# Patient Record
Sex: FEMALE | Race: WHITE | NOT HISPANIC OR LATINO
[De-identification: names, ages, dates, MRNs, and addresses within clinical notes are randomized per-mention and may not be internally consistent; named-entity substitution may affect disease eponyms.]

---

## 2019-03-06 ENCOUNTER — APPOINTMENT (OUTPATIENT)
Age: 79
End: 2019-03-06
Payer: MEDICARE

## 2019-03-06 ENCOUNTER — OUTPATIENT (OUTPATIENT)
Dept: OUTPATIENT SERVICES | Facility: HOSPITAL | Age: 79
LOS: 1 days | End: 2019-03-06
Payer: MEDICARE

## 2019-03-06 DIAGNOSIS — Z00.8 ENCOUNTER FOR OTHER GENERAL EXAMINATION: ICD-10-CM

## 2019-03-06 DIAGNOSIS — N83.209 UNSPECIFIED OVARIAN CYST, UNSPECIFIED SIDE: ICD-10-CM

## 2019-03-06 PROCEDURE — 71046 X-RAY EXAM CHEST 2 VIEWS: CPT | Mod: 26

## 2019-03-06 PROCEDURE — 71046 X-RAY EXAM CHEST 2 VIEWS: CPT

## 2019-12-20 ENCOUNTER — APPOINTMENT (OUTPATIENT)
Dept: RADIOLOGY | Facility: HOSPITAL | Age: 79
End: 2019-12-20
Payer: MEDICARE

## 2019-12-20 ENCOUNTER — OUTPATIENT (OUTPATIENT)
Dept: OUTPATIENT SERVICES | Facility: HOSPITAL | Age: 79
LOS: 1 days | End: 2019-12-20
Payer: MEDICARE

## 2019-12-20 DIAGNOSIS — Z00.8 ENCOUNTER FOR OTHER GENERAL EXAMINATION: ICD-10-CM

## 2019-12-20 PROCEDURE — 71046 X-RAY EXAM CHEST 2 VIEWS: CPT | Mod: 26

## 2019-12-20 PROCEDURE — 71046 X-RAY EXAM CHEST 2 VIEWS: CPT

## 2020-07-08 ENCOUNTER — OUTPATIENT (OUTPATIENT)
Dept: OUTPATIENT SERVICES | Facility: HOSPITAL | Age: 80
LOS: 1 days | End: 2020-07-08
Payer: MEDICARE

## 2020-07-08 ENCOUNTER — APPOINTMENT (OUTPATIENT)
Dept: MRI IMAGING | Facility: HOSPITAL | Age: 80
End: 2020-07-08
Payer: MEDICARE

## 2020-07-08 DIAGNOSIS — M23.91 UNSPECIFIED INTERNAL DERANGEMENT OF RIGHT KNEE: ICD-10-CM

## 2020-07-08 PROCEDURE — 73721 MRI JNT OF LWR EXTRE W/O DYE: CPT | Mod: 26,RT

## 2020-07-08 PROCEDURE — 73721 MRI JNT OF LWR EXTRE W/O DYE: CPT

## 2020-10-12 ENCOUNTER — APPOINTMENT (OUTPATIENT)
Dept: ULTRASOUND IMAGING | Facility: HOSPITAL | Age: 80
End: 2020-10-12
Payer: MEDICARE

## 2020-10-12 ENCOUNTER — APPOINTMENT (OUTPATIENT)
Dept: RADIOLOGY | Facility: HOSPITAL | Age: 80
End: 2020-10-12
Payer: MEDICARE

## 2020-10-12 ENCOUNTER — OUTPATIENT (OUTPATIENT)
Dept: OUTPATIENT SERVICES | Facility: HOSPITAL | Age: 80
LOS: 1 days | End: 2020-10-12
Payer: MEDICARE

## 2020-10-12 DIAGNOSIS — Z00.8 ENCOUNTER FOR OTHER GENERAL EXAMINATION: ICD-10-CM

## 2020-10-12 PROCEDURE — 77080 DXA BONE DENSITY AXIAL: CPT

## 2020-10-12 PROCEDURE — 76775 US EXAM ABDO BACK WALL LIM: CPT | Mod: 26

## 2020-10-12 PROCEDURE — 76775 US EXAM ABDO BACK WALL LIM: CPT

## 2020-10-12 PROCEDURE — 77080 DXA BONE DENSITY AXIAL: CPT | Mod: 26

## 2021-01-19 ENCOUNTER — TRANSCRIPTION ENCOUNTER (OUTPATIENT)
Age: 81
End: 2021-01-19

## 2021-01-19 NOTE — ASU PATIENT PROFILE, ADULT - ANESTHESIA, PREVIOUS REACTION, PROFILE
----- Message from Tanvir Haney MD sent at 8/7/2017  8:31 AM CDT -----  Needs appt asap to schedule surgery   nausea/vomiting

## 2021-01-19 NOTE — ASU PATIENT PROFILE, ADULT - PMH
Degenerative joint disease    Diabetes mellitus  Type 2  Hashimoto's disease    Hyperlipidemia    Hypertension    Osteoarthritis    Osteoporosis

## 2021-01-20 ENCOUNTER — OUTPATIENT (OUTPATIENT)
Dept: OUTPATIENT SERVICES | Facility: HOSPITAL | Age: 81
LOS: 1 days | End: 2021-01-20
Payer: MEDICARE

## 2021-01-20 VITALS
HEIGHT: 62 IN | HEART RATE: 67 BPM | RESPIRATION RATE: 16 BRPM | TEMPERATURE: 97 F | OXYGEN SATURATION: 97 % | SYSTOLIC BLOOD PRESSURE: 141 MMHG | DIASTOLIC BLOOD PRESSURE: 62 MMHG | WEIGHT: 156.97 LBS

## 2021-01-20 VITALS
RESPIRATION RATE: 16 BRPM | SYSTOLIC BLOOD PRESSURE: 149 MMHG | OXYGEN SATURATION: 97 % | DIASTOLIC BLOOD PRESSURE: 57 MMHG | HEART RATE: 57 BPM | TEMPERATURE: 97 F

## 2021-01-20 DIAGNOSIS — H25.9 UNSPECIFIED AGE-RELATED CATARACT: Chronic | ICD-10-CM

## 2021-01-20 DIAGNOSIS — Z90.710 ACQUIRED ABSENCE OF BOTH CERVIX AND UTERUS: Chronic | ICD-10-CM

## 2021-01-20 DIAGNOSIS — H25.11 AGE-RELATED NUCLEAR CATARACT, RIGHT EYE: ICD-10-CM

## 2021-01-20 PROCEDURE — 66984 XCAPSL CTRC RMVL W/O ECP: CPT | Mod: RT

## 2021-01-20 PROCEDURE — V2632: CPT

## 2021-01-20 RX ORDER — VALSARTAN 80 MG/1
1 TABLET ORAL
Qty: 0 | Refills: 0 | DISCHARGE

## 2021-01-20 RX ORDER — AZILSARTAN KAMEDOXOMIL 40 MG/1
0 TABLET ORAL
Qty: 0 | Refills: 0 | DISCHARGE

## 2021-01-20 RX ORDER — CHOLECALCIFEROL (VITAMIN D3) 125 MCG
0 CAPSULE ORAL
Qty: 0 | Refills: 0 | DISCHARGE

## 2021-01-20 RX ORDER — KETOROLAC TROMETHAMINE 0.5 %
1 DROPS OPHTHALMIC (EYE)
Refills: 0 | Status: COMPLETED | OUTPATIENT
Start: 2021-01-20 | End: 2021-01-20

## 2021-01-20 RX ORDER — SODIUM CHLORIDE 9 MG/ML
1000 INJECTION, SOLUTION INTRAVENOUS
Refills: 0 | Status: DISCONTINUED | OUTPATIENT
Start: 2021-01-20 | End: 2021-01-20

## 2021-01-20 RX ORDER — PHENYLEPHRINE HCL 2.5 %
1 DROPS OPHTHALMIC (EYE)
Refills: 0 | Status: COMPLETED | OUTPATIENT
Start: 2021-01-20 | End: 2021-01-20

## 2021-01-20 RX ORDER — ACETAMINOPHEN 500 MG
650 TABLET ORAL EVERY 6 HOURS
Refills: 0 | Status: DISCONTINUED | OUTPATIENT
Start: 2021-01-20 | End: 2021-02-03

## 2021-01-20 RX ORDER — AMLODIPINE BESYLATE 2.5 MG/1
1 TABLET ORAL
Qty: 0 | Refills: 0 | DISCHARGE

## 2021-01-20 RX ORDER — ASPIRIN/CALCIUM CARB/MAGNESIUM 324 MG
0 TABLET ORAL
Qty: 0 | Refills: 0 | DISCHARGE

## 2021-01-20 RX ORDER — UBIDECARENONE 100 MG
0 CAPSULE ORAL
Qty: 0 | Refills: 0 | DISCHARGE

## 2021-01-20 RX ORDER — TROPICAMIDE 1 %
1 DROPS OPHTHALMIC (EYE)
Refills: 0 | Status: COMPLETED | OUTPATIENT
Start: 2021-01-20 | End: 2021-01-20

## 2021-01-20 RX ORDER — CYCLOPENTOLATE HYDROCHLORIDE 10 MG/ML
1 SOLUTION/ DROPS OPHTHALMIC
Refills: 0 | Status: COMPLETED | OUTPATIENT
Start: 2021-01-20 | End: 2021-01-20

## 2021-01-20 RX ADMIN — Medication 1 DROP(S): at 06:30

## 2021-01-20 RX ADMIN — Medication 1 DROP(S): at 06:20

## 2021-01-20 RX ADMIN — Medication 1 DROP(S): at 06:25

## 2021-01-20 RX ADMIN — CYCLOPENTOLATE HYDROCHLORIDE 1 DROP(S): 10 SOLUTION/ DROPS OPHTHALMIC at 06:25

## 2021-01-20 RX ADMIN — CYCLOPENTOLATE HYDROCHLORIDE 1 DROP(S): 10 SOLUTION/ DROPS OPHTHALMIC at 06:30

## 2021-01-20 RX ADMIN — CYCLOPENTOLATE HYDROCHLORIDE 1 DROP(S): 10 SOLUTION/ DROPS OPHTHALMIC at 06:20

## 2021-01-20 NOTE — ASU DISCHARGE PLAN (ADULT/PEDIATRIC) - ASU DC SPECIAL INSTRUCTIONSFT
Keep shield on eye until office visit today at 2 pm  Any problems call office at [phone]    OFFICE VISIT TODAY AT 2 pm  Bring Drops

## 2021-01-20 NOTE — BRIEF OPERATIVE NOTE - NSICDXBRIEFPROCEDURE_GEN_ALL_CORE_FT
PROCEDURES:  Single stage extracapsular removal of cataract with insertion of intraocular lens prosthesis by phacoemulsification 20-Jan-2021 08:03:24  Jacob Vazquez

## 2021-10-08 ENCOUNTER — OUTPATIENT (OUTPATIENT)
Dept: OUTPATIENT SERVICES | Facility: HOSPITAL | Age: 81
LOS: 1 days | End: 2021-10-08
Payer: MEDICARE

## 2021-10-08 ENCOUNTER — APPOINTMENT (OUTPATIENT)
Dept: RADIOLOGY | Facility: HOSPITAL | Age: 81
End: 2021-10-08
Payer: MEDICARE

## 2021-10-08 DIAGNOSIS — M25.511 PAIN IN RIGHT SHOULDER: ICD-10-CM

## 2021-10-08 DIAGNOSIS — H25.9 UNSPECIFIED AGE-RELATED CATARACT: Chronic | ICD-10-CM

## 2021-10-08 DIAGNOSIS — Z90.710 ACQUIRED ABSENCE OF BOTH CERVIX AND UTERUS: Chronic | ICD-10-CM

## 2021-10-08 PROBLEM — E78.5 HYPERLIPIDEMIA, UNSPECIFIED: Chronic | Status: ACTIVE | Noted: 2021-01-19

## 2021-10-08 PROBLEM — M19.90 UNSPECIFIED OSTEOARTHRITIS, UNSPECIFIED SITE: Chronic | Status: ACTIVE | Noted: 2021-01-19

## 2021-10-08 PROBLEM — E06.3 AUTOIMMUNE THYROIDITIS: Chronic | Status: ACTIVE | Noted: 2021-01-19

## 2021-10-08 PROBLEM — M81.0 AGE-RELATED OSTEOPOROSIS WITHOUT CURRENT PATHOLOGICAL FRACTURE: Chronic | Status: ACTIVE | Noted: 2021-01-19

## 2021-10-08 PROBLEM — I10 ESSENTIAL (PRIMARY) HYPERTENSION: Chronic | Status: ACTIVE | Noted: 2021-01-19

## 2021-10-08 PROBLEM — E11.9 TYPE 2 DIABETES MELLITUS WITHOUT COMPLICATIONS: Chronic | Status: ACTIVE | Noted: 2021-01-19

## 2021-10-08 PROCEDURE — 73030 X-RAY EXAM OF SHOULDER: CPT | Mod: 26,RT

## 2021-10-08 PROCEDURE — 73030 X-RAY EXAM OF SHOULDER: CPT

## 2022-04-13 ENCOUNTER — TRANSCRIPTION ENCOUNTER (OUTPATIENT)
Age: 82
End: 2022-04-13

## 2022-09-20 ENCOUNTER — APPOINTMENT (OUTPATIENT)
Dept: ENDOCRINOLOGY | Facility: CLINIC | Age: 82
End: 2022-09-20

## 2022-09-27 ENCOUNTER — APPOINTMENT (OUTPATIENT)
Dept: ENDOCRINOLOGY | Facility: CLINIC | Age: 82
End: 2022-09-27

## 2022-09-27 VITALS
BODY MASS INDEX: 31.22 KG/M2 | SYSTOLIC BLOOD PRESSURE: 110 MMHG | TEMPERATURE: 98 F | WEIGHT: 174 LBS | OXYGEN SATURATION: 98 % | HEIGHT: 62.5 IN | HEART RATE: 78 BPM | DIASTOLIC BLOOD PRESSURE: 66 MMHG

## 2022-09-27 DIAGNOSIS — Z82.49 FAMILY HISTORY OF ISCHEMIC HEART DISEASE AND OTHER DISEASES OF THE CIRCULATORY SYSTEM: ICD-10-CM

## 2022-09-27 DIAGNOSIS — L40.50 ARTHROPATHIC PSORIASIS, UNSPECIFIED: ICD-10-CM

## 2022-09-27 DIAGNOSIS — F41.9 ANXIETY DISORDER, UNSPECIFIED: ICD-10-CM

## 2022-09-27 DIAGNOSIS — Z80.0 FAMILY HISTORY OF MALIGNANT NEOPLASM OF DIGESTIVE ORGANS: ICD-10-CM

## 2022-09-27 DIAGNOSIS — M54.32 SCIATICA, LEFT SIDE: ICD-10-CM

## 2022-09-27 DIAGNOSIS — I10 ESSENTIAL (PRIMARY) HYPERTENSION: ICD-10-CM

## 2022-09-27 DIAGNOSIS — N20.0 CALCULUS OF KIDNEY: ICD-10-CM

## 2022-09-27 PROCEDURE — 99203 OFFICE O/P NEW LOW 30 MIN: CPT

## 2022-09-27 NOTE — REVIEW OF SYSTEMS
[FreeTextEntry2] : Constitutional: No Fever\par Eyes: No visual difficulty\par ENT: Has difficulty hearing\par Cardiovascular: No palpitations\par Respiratory: No Cough\par Gastrointestinal: No nausea\par Genitourinary: No dysuria\par Musculoskeletal: No joint pain\par Neurological: No headaches\par Psychiatry: No sleep abnormalities\par Skin: No rashes\par Hematology: No bleeding

## 2022-09-27 NOTE — ASSESSMENT
[FreeTextEntry1] : The patient was found to have thyroid nodules on the MRI of the C-spine so I ordered a thyroid US.\par \par I WILL TRY TO OBTAIN THE OLD THYROID US REPORTS FROM HER PREVIOUS ENDOCRINOLOGIST - Kelly Humphrey MD - Endocrinology / Metab / Diabetes, Internal Medicine PHONE - [phone], FAX [phone]\par [address] [facility], [address], [place] [address]\par \par The patient has an autoimmune thyroid disorder - I ordered a TSH level and the rest of the thyroid antibody tests.\par \par Plan:\par 1. Labs done today - TSH, TPO abs, TSI, TSH-R abs\par 2. Thyroid US\par 3. Copy of previous thyroid US requested\par 4. Follow up one week after thyroid US done to review results.

## 2022-09-27 NOTE — PHYSICAL EXAM
[de-identified] : General: No distress, well nourished\par Eyes: Normal Sclera, EOMI, PERRL\par ENT: Normal appearance of the nose, normal oropharynx\par Neck/Thyroid: No cervical lymphadenopathy, thyroid gland 20 g in size, no thyroid nodules, non-tender\par Respiratory: No use of accessory muscles of respiration, vesicular breath sounds heard bilaterally, no crepitations or ronchi\par Cardiovascular: S1 and S2 heard and normal, no S3 or S4, no murmurs, radial pulse normal bilaterally\par Abdomen: soft, non-tender, no masses, normal bowel sounds\par Musculoskeletal: No swelling or deformities of joints of hands, no pedal edema\par Neurological: Normal range of motion in the hands, Normal brachioradialis reflexes bilaterally\par Psychiatry: Patient converses normally, good judgement and insight\par Skin: No rashes in hands, no nodules palpated in hands

## 2022-09-27 NOTE — HISTORY OF PRESENT ILLNESS
[FreeTextEntry1] : Problems:\par 1. Thyroid nodules\par 2. Autoimmune thyroid disorder\par \par Thyroid nodules/Autoimmune thyroid disorder\par 1. Diagnosed in 2020 with thyroid nodules when found on MRI c spine done for neck pain.\par In October 2021, thyroglobulin antibodies were positive\par 2. Labs:\par 10/15/2021 - TSH 1.83 N, Thyroglobulin antibodies 385 (</+40), thyroglobulin level 11.3 N, TSH 1.83 N\par 3. Radiology:\par 07/25/2022 - MRI C-spine - bilateral thyroid nodules identified measuring 2.2 cm on the left and 1.4 cm on the right. \par Patient never had an FNA.\par 4. No family history of thyroid disorders, no family history of thyroid cancer, no personal history of radiation treatment\par 5. Compression symptoms - no dysphagia, no SOB on lying flat

## 2022-09-28 ENCOUNTER — NON-APPOINTMENT (OUTPATIENT)
Age: 82
End: 2022-09-28

## 2022-09-28 LAB — TSH SERPL-ACNC: 1.09 UIU/ML

## 2022-09-29 LAB — THYROPEROXIDASE AB SERPL IA-ACNC: <10 IU/ML

## 2022-09-30 LAB
TSH RECEPTOR AB: <1.1 IU/L
TSI ACT/NOR SER: <0.1 IU/L

## 2022-11-09 ENCOUNTER — RESULT REVIEW (OUTPATIENT)
Age: 82
End: 2022-11-09

## 2022-11-09 ENCOUNTER — OUTPATIENT (OUTPATIENT)
Dept: OUTPATIENT SERVICES | Facility: HOSPITAL | Age: 82
LOS: 1 days | End: 2022-11-09
Payer: MEDICARE

## 2022-11-09 ENCOUNTER — APPOINTMENT (OUTPATIENT)
Dept: ULTRASOUND IMAGING | Facility: HOSPITAL | Age: 82
End: 2022-11-09

## 2022-11-09 DIAGNOSIS — H25.9 UNSPECIFIED AGE-RELATED CATARACT: Chronic | ICD-10-CM

## 2022-11-09 DIAGNOSIS — E04.2 NONTOXIC MULTINODULAR GOITER: ICD-10-CM

## 2022-11-09 DIAGNOSIS — Z90.710 ACQUIRED ABSENCE OF BOTH CERVIX AND UTERUS: Chronic | ICD-10-CM

## 2022-11-09 PROCEDURE — 76536 US EXAM OF HEAD AND NECK: CPT | Mod: 26

## 2022-11-09 PROCEDURE — 76536 US EXAM OF HEAD AND NECK: CPT

## 2022-11-16 ENCOUNTER — RESULT REVIEW (OUTPATIENT)
Age: 82
End: 2022-11-16

## 2022-11-16 ENCOUNTER — APPOINTMENT (OUTPATIENT)
Dept: ENDOCRINOLOGY | Facility: CLINIC | Age: 82
End: 2022-11-16

## 2022-11-16 VITALS
DIASTOLIC BLOOD PRESSURE: 60 MMHG | TEMPERATURE: 97.7 F | HEIGHT: 62 IN | OXYGEN SATURATION: 97 % | WEIGHT: 170 LBS | BODY MASS INDEX: 31.28 KG/M2 | RESPIRATION RATE: 16 BRPM | HEART RATE: 87 BPM | SYSTOLIC BLOOD PRESSURE: 122 MMHG

## 2022-11-16 PROCEDURE — 99213 OFFICE O/P EST LOW 20 MIN: CPT

## 2022-11-16 NOTE — PHYSICAL EXAM
[de-identified] : General: No distress, well nourished\par Eyes: Normal Sclera, EOMI, PERRL\par ENT: Normal appearance of the nose, normal oropharynx\par Neck/Thyroid: No cervical lymphadenopathy, thyroid gland 20 g in size, no thyroid nodules, non-tender\par Respiratory: No use of accessory muscles of respiration, vesicular breath sounds heard bilaterally, no crepitations or ronchi\par Cardiovascular: S1 and S2 heard and normal, no S3 or S4, no murmurs, radial pulse normal bilaterally\par Abdomen: soft, non-tender, no masses, normal bowel sounds\par Musculoskeletal: No swelling or deformities of joints of hands, no pedal edema\par Neurological: Normal range of motion in the hands, Normal brachioradialis reflexes bilaterally\par Psychiatry: Patient converses normally, good judgement and insight\par Skin: No rashes in hands, no nodules palpated in hands

## 2022-11-16 NOTE — ASSESSMENT
[FreeTextEntry1] : This patient has a euthyroid multinodular goiter - the patient has a history of radiation therapy so I ordered FNA of the left 2.2 x 1 x 1.6 cm midpole nodule and left lower pole 2 x 1.1 x 1.5 cm nodule.  \par \par The patient has an autoimmune thyroid disorder - thyroglobulin antibodies were positive but TSI, TSH-R abs and TPO antibodies were negative - the patient is euthyroid - I will monitor her TSH level every 3 to 6 months.\par \par Last TSH normal in September 2022. \par \par Plan:\par 1.  US guided FNA of the left 2.2 x 1 x 1.6 cm midpole nodule and left lower pole 2 x 1.1 x 1.5 cm nodule.  \par 2.  Follow up in 2 weeks after FNA done to review results.

## 2022-11-16 NOTE — HISTORY OF PRESENT ILLNESS
[FreeTextEntry1] : Problems:\par 1. Thyroid nodules\par 2. Autoimmune thyroid disorder - thyroglobulin antibodies positive in September 2022\par \par Thyroid nodules/Autoimmune thyroid disorder\par 1. Diagnosed in 2020 with thyroid nodules when found on MRI c spine done for neck pain.\par In October 2021, thyroglobulin antibodies were positive\par 2. Labs:\par 10/15/2021 - TSH 1.83 N, Thyroglobulin antibodies 385 (</+40), thyroglobulin level 11.3 N, TSH 1.83 N\par September 2022 - TSH normal, TPO antibodies normal, TSH-R abs normal, TSI normal\par 3. Radiology:\par A. 07/30/2020 - US thyroid - Right lobe measures 43 mm and contains multiple subcentimeter thyroid nodules.\par Left lobe measures 57 mm and contains a left solid nodule in the upper pole measuring 9 x 6 x 10 mm and a 31 x 12 x 19 mm lower pole thyroid nodule.  No abnormal lymph nodes seen. \par B. 07/25/2022 - MRI C-spine - bilateral thyroid nodules identified measuring 2.2 cm on the left and 1.4 cm on the right. \par Patient never had an FNA.\par C. 11/09/2022 - US thyroid - right lobe measures 4.6 cm and contains an upper pole 1.1 x 0.7 x 1.1 cm nodule - multiple subcentimeter nodules also seen in the right lobe. The left lobe measures 5.6 cm and contains an  upper pole 1.1 x 0.6 x 1 cm nodule and a mid pole 1.1 x 0.8 x 0.8 cm nodule and a 2.2 x 1 x 1.6 cm midpole nodule and a lower pole 2 x 1.1 x 1.5 cm nodule.  \par 4. 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 \par 5. Compression symptoms - no dysphagia, no SOB on lying flat

## 2022-12-29 ENCOUNTER — NON-APPOINTMENT (OUTPATIENT)
Age: 82
End: 2022-12-29

## 2022-12-30 ENCOUNTER — APPOINTMENT (OUTPATIENT)
Dept: RADIOLOGY | Facility: HOSPITAL | Age: 82
End: 2022-12-30
Payer: MEDICARE

## 2022-12-30 ENCOUNTER — OUTPATIENT (OUTPATIENT)
Dept: OUTPATIENT SERVICES | Facility: HOSPITAL | Age: 82
LOS: 1 days | End: 2022-12-30
Payer: MEDICARE
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
## 2022-12-30 DIAGNOSIS — H25.9 UNSPECIFIED AGE-RELATED CATARACT: Chronic | ICD-10-CM

## 2022-12-30 DIAGNOSIS — Z90.710 ACQUIRED ABSENCE OF BOTH CERVIX AND UTERUS: Chronic | ICD-10-CM

## 2022-12-30 PROCEDURE — 73130 X-RAY EXAM OF HAND: CPT | Mod: 26,RT

## 2022-12-30 PROCEDURE — 72190 X-RAY EXAM OF PELVIS: CPT

## 2022-12-30 PROCEDURE — 73502 X-RAY EXAM HIP UNI 2-3 VIEWS: CPT | Mod: 26,LT

## 2022-12-30 PROCEDURE — 73502 X-RAY EXAM HIP UNI 2-3 VIEWS: CPT

## 2022-12-30 PROCEDURE — 73130 X-RAY EXAM OF HAND: CPT

## 2023-01-11 ENCOUNTER — OUTPATIENT (OUTPATIENT)
Dept: OUTPATIENT SERVICES | Facility: HOSPITAL | Age: 83
LOS: 1 days | End: 2023-01-11
Payer: MEDICARE

## 2023-01-11 ENCOUNTER — APPOINTMENT (OUTPATIENT)
Dept: ULTRASOUND IMAGING | Facility: IMAGING CENTER | Age: 83
End: 2023-01-11
Payer: MEDICARE

## 2023-01-11 ENCOUNTER — RESULT REVIEW (OUTPATIENT)
Age: 83
End: 2023-01-11

## 2023-01-11 DIAGNOSIS — Z90.710 ACQUIRED ABSENCE OF BOTH CERVIX AND UTERUS: Chronic | ICD-10-CM

## 2023-01-11 DIAGNOSIS — R10.2 PELVIC AND PERINEAL PAIN: ICD-10-CM

## 2023-01-11 DIAGNOSIS — Z00.8 ENCOUNTER FOR OTHER GENERAL EXAMINATION: ICD-10-CM

## 2023-01-11 DIAGNOSIS — H25.9 UNSPECIFIED AGE-RELATED CATARACT: Chronic | ICD-10-CM

## 2023-01-11 PROCEDURE — 88173 CYTOPATH EVAL FNA REPORT: CPT

## 2023-01-11 PROCEDURE — 88172 CYTP DX EVAL FNA 1ST EA SITE: CPT

## 2023-01-11 PROCEDURE — 10005 FNA BX W/US GDN 1ST LES: CPT

## 2023-01-11 PROCEDURE — 88173 CYTOPATH EVAL FNA REPORT: CPT | Mod: 26

## 2023-01-19 ENCOUNTER — APPOINTMENT (OUTPATIENT)
Dept: ENDOCRINOLOGY | Facility: CLINIC | Age: 83
End: 2023-01-19
Payer: MEDICARE

## 2023-01-19 VITALS
RESPIRATION RATE: 16 BRPM | HEIGHT: 62 IN | BODY MASS INDEX: 31.47 KG/M2 | OXYGEN SATURATION: 98 % | TEMPERATURE: 97.1 F | SYSTOLIC BLOOD PRESSURE: 146 MMHG | HEART RATE: 81 BPM | DIASTOLIC BLOOD PRESSURE: 70 MMHG | WEIGHT: 171 LBS

## 2023-01-19 PROCEDURE — 99214 OFFICE O/P EST MOD 30 MIN: CPT

## 2023-01-19 NOTE — ASSESSMENT
[FreeTextEntry1] : This patient has a euthyroid multinodular goiter - FNA of the left 2.2 x 1 x 1.6 cm midpole nodule in Jan 2023 was benign - Next thyroid US due in November 2023.  \par \par The patient has an autoimmune thyroid disorder - thyroglobulin antibodies were positive but TSI, TSH-R abs and TPO antibodies were negative - the patient is euthyroid - I will monitor her TSH level every 3 to 6 months.\par \par Last TSH normal in September 2022. \par Last Thyroid US - November 2022 \par \par Plan:\par 1.  Thyroid US to be done in November 2023\par 2.  TSH to be done in November 2023\par 3. Follow up in November 2023 to review results

## 2023-01-19 NOTE — HISTORY OF PRESENT ILLNESS
[FreeTextEntry1] : Problems:\par 1. Thyroid nodules\par 2. Autoimmune thyroid disorder - thyroglobulin antibodies positive in September 2022\par \par Thyroid nodules/Autoimmune thyroid disorder\par 1. Diagnosed in 2020 with thyroid nodules when found on MRI c spine done for neck pain.\par In October 2021, thyroglobulin antibodies were positive\par 2. Labs:\par 10/15/2021 - TSH 1.83 N, Thyroglobulin antibodies 385 (</+40), thyroglobulin level 11.3 N, TSH 1.83 N\par September 2022 - TSH normal, TPO antibodies normal, TSH-R abs normal, TSI normal\par 3. Radiology:\par A. 07/30/2020 - US thyroid - Right lobe measures 43 mm and contains multiple subcentimeter thyroid nodules.\par Left lobe measures 57 mm and contains a left solid nodule in the upper pole measuring 9 x 6 x 10 mm and a 31 x 12 x 19 mm lower pole thyroid nodule.  No abnormal lymph nodes seen. \par B. 07/25/2022 - MRI C-spine - bilateral thyroid nodules identified measuring 2.2 cm on the left and 1.4 cm on the right. \par Patient never had an FNA.\par C. 11/09/2022 - US thyroid - right lobe measures 4.6 cm and contains an upper pole 1.1 x 0.7 x 1.1 cm nodule - multiple subcentimeter nodules also seen in the right lobe. The left lobe measures 5.6 cm and contains an  upper pole 1.1 x 0.6 x 1 cm nodule and a mid pole 1.1 x 0.8 x 0.8 cm nodule and a 2.2 x 1 x 1.6 cm midpole nodule and a lower pole 2 x 1.1 x 1.5 cm nodule.  \par D. 01/11/2023 - FNA of left mid pole thyroid nodule was benign ([place] Category II) - per the radiologist the left 2 cm lower pole nodule was cystic in appearance so FNA was not done as it was deemed to be a benign nodule\par 4. 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 \par 5. Compression symptoms - no dysphagia, no SOB on lying flat

## 2023-01-19 NOTE — PHYSICAL EXAM
[de-identified] : General: No distress, well nourished\par Eyes: Normal Sclera, EOMI, PERRL\par ENT: Normal appearance of the nose, normal oropharynx\par Neck/Thyroid: No cervical lymphadenopathy, thyroid gland 20 g in size, no thyroid nodules, non-tender\par Respiratory: No use of accessory muscles of respiration, vesicular breath sounds heard bilaterally, no crepitations or ronchi\par Cardiovascular: S1 and S2 heard and normal, no S3 or S4, no murmurs, radial pulse normal bilaterally\par Abdomen: soft, non-tender, no masses, normal bowel sounds\par Musculoskeletal: No swelling or deformities of joints of hands, no pedal edema\par Neurological: Normal range of motion in the hands, Normal brachioradialis reflexes bilaterally\par Psychiatry: Patient converses normally, good judgement and insight\par Skin: No rashes in hands, no nodules palpated in hands

## 2023-05-02 ENCOUNTER — APPOINTMENT (OUTPATIENT)
Dept: ENDOCRINOLOGY | Facility: CLINIC | Age: 83
End: 2023-05-02
Payer: MEDICARE

## 2023-05-02 VITALS
DIASTOLIC BLOOD PRESSURE: 80 MMHG | TEMPERATURE: 97.7 F | HEIGHT: 62 IN | BODY MASS INDEX: 31.65 KG/M2 | SYSTOLIC BLOOD PRESSURE: 130 MMHG | WEIGHT: 172 LBS | OXYGEN SATURATION: 97 % | HEART RATE: 80 BPM

## 2023-05-02 PROCEDURE — 99214 OFFICE O/P EST MOD 30 MIN: CPT

## 2023-05-02 NOTE — REASON FOR VISIT
[Follow - Up] : a follow-up visit [Thyroid nodule/ MNG] : thyroid nodule/ MNG [Hyperparathyroidism] : hyperparathyroidism

## 2023-05-02 NOTE — PHYSICAL EXAM
[de-identified] : General: No distress, well nourished\par Eyes: Normal Sclera, EOMI, PERRL\par ENT: Normal appearance of the nose, normal oropharynx\par Neck/Thyroid: No cervical lymphadenopathy, thyroid gland 20 g in size, no thyroid nodules, non-tender\par Respiratory: No use of accessory muscles of respiration, vesicular breath sounds heard bilaterally, no crepitations or ronchi\par Cardiovascular: S1 and S2 heard and normal, no S3 or S4, no murmurs, radial pulse normal bilaterally\par Abdomen: soft, non-tender, no masses, normal bowel sounds\par Musculoskeletal: No swelling or deformities of joints of hands, no pedal edema\par Neurological: Normal range of motion in the hands, Normal brachioradialis reflexes bilaterally\par Psychiatry: Patient converses normally, good judgement and insight\par Skin: No rashes in hands, no nodules palpated in hands

## 2023-05-02 NOTE — DATA REVIEWED
[FreeTextEntry1] : 04/04/2023 - Cr 1.11 N, GFR 49, Calcium 10.7 (8.4 to 10.5), albumin 4.4, corrected calcium 10.38 N, PTH 46

## 2023-05-02 NOTE — HISTORY OF PRESENT ILLNESS
[FreeTextEntry1] : Problems:\par 1. Thyroid nodules\par 2. Autoimmune thyroid disorder - thyroglobulin antibodies positive in September 2022\par 3. Evaluation for hyperparathyroidism \par 4. Osteoporosis - patient is on Prolia - I defer management to Rheumatology. \par \par Thyroid nodules/Autoimmune thyroid disorder\par 1. Diagnosed in 2020 with thyroid nodules when found on MRI c spine done for neck pain.\par In October 2021, thyroglobulin antibodies were positive\par 2. Labs:\par 10/15/2021 - TSH 1.83 N, Thyroglobulin antibodies 385 (</+40), thyroglobulin level 11.3 N, TSH 1.83 N\par September 2022 - TSH normal, TPO antibodies normal, TSH-R abs normal, TSI normal\par 3. Radiology:\par A. 07/30/2020 - US thyroid - Right lobe measures 43 mm and contains multiple subcentimeter thyroid nodules.\par Left lobe measures 57 mm and contains a left solid nodule in the upper pole measuring 9 x 6 x 10 mm and a 31 x 12 x 19 mm lower pole thyroid nodule.  No abnormal lymph nodes seen. \par B. 07/25/2022 - MRI C-spine - bilateral thyroid nodules identified measuring 2.2 cm on the left and 1.4 cm on the right. \par Patient never had an FNA.\par C. 11/09/2022 - US thyroid - right lobe measures 4.6 cm and contains an upper pole 1.1 x 0.7 x 1.1 cm nodule - multiple subcentimeter nodules also seen in the right lobe. The left lobe measures 5.6 cm and contains an  upper pole 1.1 x 0.6 x 1 cm nodule and a mid pole 1.1 x 0.8 x 0.8 cm nodule and a 2.2 x 1 x 1.6 cm midpole nodule and a lower pole 2 x 1.1 x 1.5 cm nodule.  \par D. 01/11/2023 - FNA of left mid pole thyroid nodule was benign ([place] Category II) - per the radiologist the left 2 cm lower pole nodule was cystic in appearance so FNA was not done as it was deemed to be a benign nodule\par 4. 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 \par 5. Compression symptoms - no dysphagia, no SOB on lying flat\par \par Evaluation for hyperparathyroidism\par 1. Patient follows up with Nephrology as her Creatinine fluctuates between elevated and normal due to kidney stones and decreased fluid intake.\par The patient's Nephrologist told her she had hyperparathyroidism in 2023 but I did not see these results\par 2. Labs:\par 04/04/2023 - Cr 1.11 N, GFR 49, Calcium 10.7 (8.4 to 10.5), albumin 4.4, corrected calcium 10.38 N, PTH 46\par \par \par

## 2023-05-02 NOTE — ASSESSMENT
[FreeTextEntry1] : This patient has a euthyroid multinodular goiter - FNA of the left 2.2 x 1 x 1.6 cm midpole nodule in Jan 2023 was benign - Next thyroid US due in November 2023.  \par \par The patient has an autoimmune thyroid disorder - thyroglobulin antibodies were positive but TSI, TSH-R abs and TPO antibodies were negative - the patient is euthyroid - I will monitor her TSH level every 3 to 6 months.\par \par Last TSH normal in September 2022. \par Last Thyroid US - November 2022 \par \par The patient was told she had hyperparathyroidism by her Nephrologist so I ordered labs to evaluate for this.  \par \par Plan:\par 1. Thyroid US to be done in November 2023\par 2. Labs to be done today - see below\par 3. Follow up in 5 days  to review results - telehealth visit ok

## 2023-05-03 LAB
25(OH)D3 SERPL-MCNC: 50.6 NG/ML
ALBUMIN SERPL ELPH-MCNC: 4.5 G/DL
ALP BLD-CCNC: 70 U/L
ALT SERPL-CCNC: 22 U/L
ANION GAP SERPL CALC-SCNC: 14 MMOL/L
AST SERPL-CCNC: 18 U/L
BILIRUB SERPL-MCNC: <0.2 MG/DL
BUN SERPL-MCNC: 30 MG/DL
CALCIUM SERPL-MCNC: 10.8 MG/DL
CALCIUM SERPL-MCNC: 10.8 MG/DL
CHLORIDE SERPL-SCNC: 106 MMOL/L
CO2 SERPL-SCNC: 22 MMOL/L
CREAT SERPL-MCNC: 0.91 MG/DL
EGFR: 62 ML/MIN/1.73M2
GLUCOSE SERPL-MCNC: 110 MG/DL
PARATHYROID HORMONE INTACT: 49 PG/ML
PHOSPHATE SERPL-MCNC: 3.3 MG/DL
POTASSIUM SERPL-SCNC: 4.2 MMOL/L
PROT SERPL-MCNC: 7 G/DL
SODIUM SERPL-SCNC: 142 MMOL/L
TSH SERPL-ACNC: 0.84 UIU/ML

## 2023-05-08 ENCOUNTER — APPOINTMENT (OUTPATIENT)
Dept: ENDOCRINOLOGY | Facility: CLINIC | Age: 83
End: 2023-05-08
Payer: MEDICARE

## 2023-05-08 DIAGNOSIS — E04.2 NONTOXIC MULTINODULAR GOITER: ICD-10-CM

## 2023-05-08 DIAGNOSIS — E21.3 HYPERPARATHYROIDISM, UNSPECIFIED: ICD-10-CM

## 2023-05-08 DIAGNOSIS — D89.89 OTHER SPECIFIED DISORDERS INVOLVING THE IMMUNE MECHANISM, NOT ELSEWHERE CLASSIFIED: ICD-10-CM

## 2023-05-08 PROCEDURE — 99214 OFFICE O/P EST MOD 30 MIN: CPT | Mod: 95

## 2023-05-08 NOTE — ASSESSMENT
[FreeTextEntry1] : This patient has a euthyroid multinodular goiter - FNA of the left 2.2 x 1 x 1.6 cm midpole nodule in Jan 2023 was benign - Next thyroid US due in November 2023.  \par \par The patient has an autoimmune thyroid disorder - thyroglobulin antibodies were positive but TSI, TSH-R abs and TPO antibodies were negative - the patient is euthyroid - I will monitor her TSH level every 3 to 6 months.\par \par Last TSH normal in May 2023 \par Last Thyroid US - November 2022 \par \par The patient was told she had hyperparathyroidism by her Nephrologist - In May 2023, PTH was normal, 25 OH vitamin D level was normal, corrected calcium level was normal but measured calcium level was elevated. Since the corrected calcium was normal, she can be considered eucalcemic. I will monitor her PTH, 25 OH vitamin D level and corrected calcium for now. \par \par Plan:\par 1.  Thyroid US to be done in November 2023\par 2.  Labs to be done in  November 2023 - see below\par 3. Follow up in November 2023 to review results

## 2023-05-08 NOTE — HISTORY OF PRESENT ILLNESS
[FreeTextEntry1] : Problems:\par 1. Thyroid nodules\par 2. Autoimmune thyroid disorder - thyroglobulin antibodies positive in September 2022\par 3. Evaluation for hyperparathyroidism - In May 2023 - PTH N, 25 OH vitamin D N, corrected ca N (but measured Ca N)\par 4. Osteoporosis - patient is on Prolia - I defer management to Rheumatology. \par \par \par Thyroid nodules/Autoimmune thyroid disorder\par 1. Diagnosed in 2020 with thyroid nodules when found on MRI c spine done for neck pain.\par In October 2021, thyroglobulin antibodies were positive\par 2. Labs:\par 10/15/2021 - TSH 1.83 N, Thyroglobulin antibodies 385 (</+40), thyroglobulin level 11.3 N, TSH 1.83 N\par September 2022 - TSH normal, TPO antibodies normal, TSH-R abs normal, TSI normal\par 3. Radiology:\par A. 07/30/2020 - US thyroid - Right lobe measures 43 mm and contains multiple subcentimeter thyroid nodules.\par Left lobe measures 57 mm and contains a left solid nodule in the upper pole measuring 9 x 6 x 10 mm and a 31 x 12 x 19 mm lower pole thyroid nodule.  No abnormal lymph nodes seen. \par B. 07/25/2022 - MRI C-spine - bilateral thyroid nodules identified measuring 2.2 cm on the left and 1.4 cm on the right. \par Patient never had an FNA.\par C. 11/09/2022 - US thyroid - right lobe measures 4.6 cm and contains an upper pole 1.1 x 0.7 x 1.1 cm nodule - multiple subcentimeter nodules also seen in the right lobe. The left lobe measures 5.6 cm and contains an  upper pole 1.1 x 0.6 x 1 cm nodule and a mid pole 1.1 x 0.8 x 0.8 cm nodule and a 2.2 x 1 x 1.6 cm midpole nodule and a lower pole 2 x 1.1 x 1.5 cm nodule.  \par D. 01/11/2023 - FNA of left mid pole thyroid nodule was benign ([place] Category II) - per the radiologist the left 2 cm lower pole nodule was cystic in appearance so FNA was not done as it was deemed to be a benign nodule\par 4. 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 \par 5. Compression symptoms - no dysphagia, no SOB on lying flat\par \par \par Evaluation for hyperparathyroidism - In May 2023 - PTH N, 25 OH vitamin D N, corrected ca N (but measured Ca N)\par 1. Patient follows up with Nephrology as her Creatinine fluctuates between elevated and normal due to kidney stones and decreased fluid intake.\par The patient's Nephrologist told her she had hyperparathyroidism in 2023 but I did not see these results\par 2. Labs:\par 04/04/2023 - Cr 1.11 N, GFR 49, Calcium 10.7 (8.4 to 10.5), albumin 4.4, corrected calcium 10.38 N, PTH 46\par 05/02/2023 - Cr 0.91 N, GFR 62, Calcium 10.8 (8.4 to 10.5), albumin 4.5, corrected calcium 10.4 N, PTH 49 N, 25 OH vitamin D 50.6 N, TSH 0.84 N, serum phosphorous 3.3 N\par

## 2023-05-08 NOTE — PHYSICAL EXAM
[de-identified] : General: No distress, well nourished\par Eyes: Normal external appearance\par ENT: Normal appearance of the nose\par Neck/Thyroid: No visible neck swelling\par Respiratory: No use of accessory muscles of respiration\par Psychiatry: Patient converses normally, good judgement and insight\par Skin: No rashes seen on face

## 2023-05-08 NOTE — REASON FOR VISIT
[Home] : at home, [unfilled] , at the time of the visit. [Medical Office: ([facility])___] : at the medical office located in  [Patient] : the patient [Follow - Up] : a follow-up visit [Hyperparathyroidism] : hyperparathyroidism

## 2023-08-14 ENCOUNTER — OUTPATIENT (OUTPATIENT)
Dept: OUTPATIENT SERVICES | Facility: HOSPITAL | Age: 83
LOS: 1 days | End: 2023-08-14
Payer: MEDICARE

## 2023-08-14 ENCOUNTER — APPOINTMENT (OUTPATIENT)
Dept: RADIOLOGY | Facility: HOSPITAL | Age: 83
End: 2023-08-14
Payer: MEDICARE

## 2023-08-14 DIAGNOSIS — M81.0 AGE-RELATED OSTEOPOROSIS WITHOUT CURRENT PATHOLOGICAL FRACTURE: ICD-10-CM

## 2023-08-14 DIAGNOSIS — H25.9 UNSPECIFIED AGE-RELATED CATARACT: Chronic | ICD-10-CM

## 2023-08-14 DIAGNOSIS — Z90.710 ACQUIRED ABSENCE OF BOTH CERVIX AND UTERUS: Chronic | ICD-10-CM

## 2023-08-14 PROCEDURE — 77080 DXA BONE DENSITY AXIAL: CPT

## 2023-08-14 PROCEDURE — 77080 DXA BONE DENSITY AXIAL: CPT | Mod: 26

## 2024-07-22 ENCOUNTER — APPOINTMENT (OUTPATIENT)
Dept: ENDOCRINOLOGY | Facility: CLINIC | Age: 84
End: 2024-07-22
Payer: MEDICARE

## 2024-07-22 VITALS
TEMPERATURE: 97.4 F | WEIGHT: 178 LBS | BODY MASS INDEX: 32.76 KG/M2 | OXYGEN SATURATION: 97 % | HEIGHT: 62 IN | HEART RATE: 80 BPM | RESPIRATION RATE: 16 BRPM | DIASTOLIC BLOOD PRESSURE: 72 MMHG | SYSTOLIC BLOOD PRESSURE: 130 MMHG

## 2024-07-22 DIAGNOSIS — E04.2 NONTOXIC MULTINODULAR GOITER: ICD-10-CM

## 2024-07-22 DIAGNOSIS — Z78.9 OTHER SPECIFIED HEALTH STATUS: ICD-10-CM

## 2024-07-22 DIAGNOSIS — E21.3 HYPERPARATHYROIDISM, UNSPECIFIED: ICD-10-CM

## 2024-07-22 DIAGNOSIS — D89.89 OTHER SPECIFIED DISORDERS INVOLVING THE IMMUNE MECHANISM, NOT ELSEWHERE CLASSIFIED: ICD-10-CM

## 2024-07-22 PROCEDURE — 99214 OFFICE O/P EST MOD 30 MIN: CPT

## 2024-07-22 RX ORDER — PREGABALIN 100 MG/1
100 CAPSULE ORAL
Refills: 0 | Status: ACTIVE | COMMUNITY

## 2024-07-22 RX ORDER — HYDROCHLOROTHIAZIDE 25 MG/1
25 TABLET ORAL
Refills: 0 | Status: ACTIVE | COMMUNITY

## 2024-07-22 RX ORDER — GABAPENTIN 300 MG/1
300 CAPSULE ORAL
Refills: 0 | Status: ACTIVE | COMMUNITY

## 2024-07-22 RX ORDER — CHLORHEXIDINE GLUCONATE 4 %
LIQUID (ML) TOPICAL
Refills: 0 | Status: ACTIVE | COMMUNITY

## 2024-07-22 RX ORDER — UBIDECARENONE 200 MG
200 CAPSULE ORAL
Refills: 0 | Status: ACTIVE | COMMUNITY

## 2024-07-22 RX ORDER — ALLOPURINOL 100 MG/1
100 TABLET ORAL
Refills: 0 | Status: ACTIVE | COMMUNITY

## 2024-07-22 RX ORDER — VALSARTAN 160 MG/1
160 TABLET, COATED ORAL
Refills: 0 | Status: ACTIVE | COMMUNITY

## 2024-07-22 RX ORDER — ACETAMINOPHEN 325 MG
TABLET ORAL
Refills: 0 | Status: ACTIVE | COMMUNITY

## 2024-07-22 NOTE — ASSESSMENT
[FreeTextEntry1] : This patient has a euthyroid multinodular goiter - FNA of the left 2.2 x 1 x 1.6 cm midpole nodule in Jan 2023 was benign - Next thyroid US is due so I ordered this.  The patient has an autoimmune thyroid disorder - thyroglobulin antibodies were positive but TSI, TSH-R abs and TPO antibodies were negative - the patient is euthyroid - I will monitor her TSH level every 3 to 6 months.   Last TSH normal in May 2023 Last Thyroid US - November 2022   The patient was told she had hyperparathyroidism by her Nephrologist - In May 2023, PTH was normal, 25 OH vitamin D level was normal, corrected calcium level was normal but measured calcium level was elevated. Since the corrected calcium was normal, she can be considered eucalcemic. I will monitor her PTH, 25 OH vitamin D level and corrected calcium for now.   Plan: 1. Thyroid US 2. Labs to be done today - see below 3. Follow up 1 week after thyroid US done to review results.

## 2024-07-22 NOTE — HISTORY OF PRESENT ILLNESS
[FreeTextEntry1] : Problems: 1. Thyroid nodules 2. 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 Evaluation for hyperparathyroidism - In May 2023 - PTH N, 25 OH vitamin D N, corrected ca N (but measured Ca N) 4. Osteoporosis - patient is on Prolia - I defer management to Rheumatology.    Thyroid nodules/Autoimmune thyroid disorder 1. Diagnosed in 2020 with thyroid nodules when found on MRI c spine done for neck pain. In October 2021, thyroglobulin antibodies were positive 2. Labs: 10/15/2021 - TSH 1.83 N, Thyroglobulin antibodies 385 (</+40), thyroglobulin level 11.3 N, TSH 1.83 N September 2022 - TSH normal, TPO antibodies normal, TSH-R abs normal, TSI normal 3. Radiology: A. 07/30/2020 - US thyroid - Right lobe measures 43 mm and contains multiple subcentimeter thyroid nodules. Left lobe measures 57 mm and contains a left solid nodule in the upper pole measuring 9 x 6 x 10 mm and a 31 x 12 x 19 mm lower pole thyroid nodule.  No abnormal lymph nodes seen.  B. 07/25/2022 - MRI C-spine - bilateral thyroid nodules identified measuring 2.2 cm on the left and 1.4 cm on the right.  Patient never had an FNA. C. 11/09/2022 - US thyroid - right lobe measures 4.6 cm and contains an upper pole 1.1 x 0.7 x 1.1 cm nodule - multiple subcentimeter nodules also seen in the right lobe. The left lobe measures 5.6 cm and contains an  upper pole 1.1 x 0.6 x 1 cm nodule and a mid pole 1.1 x 0.8 x 0.8 cm nodule and a 2.2 x 1 x 1.6 cm midpole nodule and a lower pole 2 x 1.1 x 1.5 cm nodule.   D. 01/11/2023 - FNA of left mid pole thyroid nodule was benign ([place] Category II) - per the radiologist the left 2 cm lower pole nodule was cystic in appearance so FNA was not done as it was deemed to be a benign nodule 4. 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  5. Compression symptoms - no dysphagia, no SOB on lying flat   Evaluation for hyperparathyroidism - In May 2023 - PTH N, 25 OH vitamin D N, corrected ca N (but measured Ca N) 1. Patient follows up with Nephrology as her Creatinine fluctuates between elevated and normal due to kidney stones and decreased fluid intake. The patient's Nephrologist told her she had hyperparathyroidism in 2023 but I did not see these results 2. Labs: 04/04/2023 - Cr 1.11 N, GFR 49, Calcium 10.7 (8.4 to 10.5), albumin 4.4, corrected calcium 10.38 N, PTH 46 05/02/2023 - Cr 0.91 N, GFR 62, Calcium 10.8 (8.4 to 10.5), albumin 4.5, corrected calcium 10.4 N, PTH 49 N, 25 OH vitamin D 50.6 N, TSH 0.84 N, serum phosphorous 3.3 N

## 2024-07-22 NOTE — REVIEW OF SYSTEMS
[FreeTextEntry2] : Constitutional: No Fever Eyes: No visual difficulty ENT: no difficulty hearing Cardiovascular: No palpitations Respiratory: No Cough Gastrointestinal: No nausea Genitourinary: No dysuria Musculoskeletal: No joint pain Neurological: No headaches Psychiatry: No sleep abnormalities Skin: No rashes Hematology: No bleeding  diabetic ulcer

## 2024-07-22 NOTE — PHYSICAL EXAM
[de-identified] : General: No distress, well nourished Eyes: Normal Sclera, EOMI, PERRL ENT: Normal appearance of the nose, normal oropharynx Neck/Thyroid: No cervical lymphadenopathy, thyroid gland 20 g in size, no thyroid nodules, non-tender Respiratory: No use of accessory muscles of respiration, vesicular breath sounds heard bilaterally, no crepitations or ronchi Cardiovascular: S1 and S2 heard and normal, no S3 or S4, no murmurs, radial pulse normal bilaterally Abdomen: soft, non-tender, no masses, normal bowel sounds Musculoskeletal: No swelling or deformities of joints of hands, no pedal edema Neurological: Normal range of motion in the hands, Normal brachioradialis reflexes bilaterally Psychiatry: Patient converses normally, good judgement and insight Skin: No rashes in hands, no nodules palpated in hands

## 2024-07-23 LAB
25(OH)D3 SERPL-MCNC: 61.2 NG/ML
ALBUMIN SERPL ELPH-MCNC: 4.5 G/DL
ALP BLD-CCNC: 62 U/L
ALT SERPL-CCNC: 21 U/L
ANION GAP SERPL CALC-SCNC: 13 MMOL/L
AST SERPL-CCNC: 22 U/L
BILIRUB SERPL-MCNC: 0.2 MG/DL
BUN SERPL-MCNC: 38 MG/DL
CALCIUM SERPL-MCNC: 10.2 MG/DL
CALCIUM SERPL-MCNC: 10.2 MG/DL
CHLORIDE SERPL-SCNC: 108 MMOL/L
CO2 SERPL-SCNC: 23 MMOL/L
CREAT SERPL-MCNC: 1.64 MG/DL
EGFR: 30 ML/MIN/1.73M2
GLUCOSE SERPL-MCNC: 107 MG/DL
PARATHYROID HORMONE INTACT: 115 PG/ML
POTASSIUM SERPL-SCNC: 4.7 MMOL/L
PROT SERPL-MCNC: 7.1 G/DL
SODIUM SERPL-SCNC: 144 MMOL/L
TSH SERPL-ACNC: 0.9 UIU/ML

## 2024-08-13 ENCOUNTER — APPOINTMENT (OUTPATIENT)
Dept: ULTRASOUND IMAGING | Facility: HOSPITAL | Age: 84
End: 2024-08-13
Payer: MEDICARE

## 2024-08-13 PROCEDURE — 76536 US EXAM OF HEAD AND NECK: CPT | Mod: 26

## 2024-08-20 ENCOUNTER — APPOINTMENT (OUTPATIENT)
Dept: ENDOCRINOLOGY | Facility: CLINIC | Age: 84
End: 2024-08-20
Payer: MEDICARE

## 2024-08-20 VITALS
WEIGHT: 178 LBS | HEIGHT: 62 IN | BODY MASS INDEX: 32.76 KG/M2 | HEART RATE: 99 BPM | OXYGEN SATURATION: 96 % | TEMPERATURE: 97.2 F | SYSTOLIC BLOOD PRESSURE: 122 MMHG | DIASTOLIC BLOOD PRESSURE: 75 MMHG

## 2024-08-20 DIAGNOSIS — E04.2 NONTOXIC MULTINODULAR GOITER: ICD-10-CM

## 2024-08-20 DIAGNOSIS — E21.3 HYPERPARATHYROIDISM, UNSPECIFIED: ICD-10-CM

## 2024-08-20 PROCEDURE — 99214 OFFICE O/P EST MOD 30 MIN: CPT

## 2024-08-20 NOTE — PHYSICAL EXAM
[de-identified] : General: No distress, well nourished Eyes: Normal Sclera, EOMI, PERRL ENT: Normal appearance of the nose, normal oropharynx Neck/Thyroid: No cervical lymphadenopathy, thyroid gland 20 g in size, no thyroid nodules, non-tender Respiratory: No use of accessory muscles of respiration, vesicular breath sounds heard bilaterally, no crepitations or ronchi Cardiovascular: S1 and S2 heard and normal, no S3 or S4, no murmurs, radial pulse normal bilaterally Abdomen: soft, non-tender, no masses, normal bowel sounds Musculoskeletal: No swelling or deformities of joints of hands, no pedal edema Neurological: Normal range of motion in the hands, Normal brachioradialis reflexes bilaterally Psychiatry: Patient converses normally, good judgement and insight Skin: No rashes in hands, no nodules palpated in hands

## 2024-08-20 NOTE — HISTORY OF PRESENT ILLNESS
[FreeTextEntry1] : Problems: 1. Thyroid nodules 2. 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 Secondary hyperparathyroidism due to CKD 4. Osteoporosis - patient is on Prolia - I defer management to Rheumatology.    Thyroid nodules/Autoimmune thyroid disorder 1. Diagnosed in 2020 with thyroid nodules when found on MRI c spine done for neck pain. In October 2021, thyroglobulin antibodies were positive 2. Labs: 10/15/2021 - TSH 1.83 N, Thyroglobulin antibodies 385 (</+40), thyroglobulin level 11.3 N, TSH 1.83 N September 2022 - TSH normal, TPO antibodies normal, TSH-R abs normal, TSI normal 3. Radiology: A. 07/30/2020 - US thyroid - Right lobe measures 43 mm and contains multiple subcentimeter thyroid nodules. Left lobe measures 57 mm and contains a left solid nodule in the upper pole measuring 9 x 6 x 10 mm and a 31 x 12 x 19 mm lower pole thyroid nodule.  No abnormal lymph nodes seen.  B. 07/25/2022 - MRI C-spine - bilateral thyroid nodules identified measuring 2.2 cm on the left and 1.4 cm on the right.  Patient never had an FNA. C. 11/09/2022 - US thyroid - right lobe measures 4.6 cm and contains an upper pole 1.1 x 0.7 x 1.1 cm nodule - multiple subcentimeter nodules also seen in the right lobe. The left lobe measures 5.6 cm and contains an  upper pole 1.1 x 0.6 x 1 cm nodule and a mid pole 1.1 x 0.8 x 0.8 cm nodule and a 2.2 x 1 x 1.6 cm midpole nodule and a lower pole 2 x 1.1 x 1.5 cm nodule.   D. 01/11/2023 - FNA of left mid pole thyroid nodule was benign ([place] Category II) - per the radiologist the left 2 cm lower pole nodule was cystic in appearance so FNA was not done as it was deemed to be a benign nodule 4. 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  5. Compression symptoms - no dysphagia, no SOB on lying flat   Secondary hyperparathyroidism  1. Patient follows up with Nephrology as her Creatinine fluctuates between elevated and normal due to kidney stones and decreased fluid intake. The patient's Nephrologist told her she had hyperparathyroidism in 2023 but I did not see these results 2. Labs: 04/04/2023 - Cr 1.11 N, GFR 49, Calcium 10.7 (8.4 to 10.5), albumin 4.4, corrected calcium 10.38 N, PTH 46 05/02/2023 - Cr 0.91 N, GFR 62, Calcium 10.8 (8.4 to 10.5), albumin 4.5, corrected calcium 10.4 N, PTH 49 N, 25 OH vitamin D 50.6 N, TSH 0.84 N, serum phosphorous 3.3 N July 2024 - Labs in keeping with secondary hyperparathyroidism due to CKD

## 2024-08-20 NOTE — ASSESSMENT
[FreeTextEntry1] : This patient has a euthyroid multinodular goiter - FNA of the left 2.2 x 1 x 1.6 cm midpole nodule in Jan 2023 was benign. The August 2024 thyroid US revealed the thyroid nodules were stable compared to that of 2022 - no indication for repeat FNA - next thyroid US due in Sept 2025.  The patient has an autoimmune thyroid disorder - thyroglobulin antibodies were positive but TSI, TSH-R abs and TPO antibodies were negative - the patient is euthyroid - I will monitor her TSH level.   Last TSH normal in July 2024 Last Thyroid US - Augsut 2024  The patient was told she had hyperparathyroidism by her Nephrologist - In May 2023, PTH was normal, 25 OH vitamin D level was normal, corrected calcium level was normal but measured calcium level was elevated. Since the corrected calcium was normal, she can be considered eucalcemic.  In July 2024, the labs were in keeping with secondary hyperparathyroidism due to CKD - patient follows up with Nephrology.  I will monitor her PTH, 25 OH vitamin D level and corrected calcium for now.  Plan: 1. Thyroid US to be done in August 2025 2. Labs to be done in August 2025 - see below 3. Follow up  August 2025  done to review results.

## 2025-03-27 ENCOUNTER — APPOINTMENT (OUTPATIENT)
Dept: ENDOCRINOLOGY | Facility: CLINIC | Age: 85
End: 2025-03-27
Payer: MEDICARE

## 2025-03-27 VITALS
WEIGHT: 184 LBS | TEMPERATURE: 97.8 F | HEART RATE: 82 BPM | RESPIRATION RATE: 16 BRPM | OXYGEN SATURATION: 96 % | DIASTOLIC BLOOD PRESSURE: 78 MMHG | BODY MASS INDEX: 33.86 KG/M2 | SYSTOLIC BLOOD PRESSURE: 142 MMHG | HEIGHT: 62 IN

## 2025-03-27 DIAGNOSIS — E21.3 HYPERPARATHYROIDISM, UNSPECIFIED: ICD-10-CM

## 2025-03-27 DIAGNOSIS — D89.89 OTHER SPECIFIED DISORDERS INVOLVING THE IMMUNE MECHANISM, NOT ELSEWHERE CLASSIFIED: ICD-10-CM

## 2025-03-27 DIAGNOSIS — E04.2 NONTOXIC MULTINODULAR GOITER: ICD-10-CM

## 2025-03-27 PROCEDURE — 99215 OFFICE O/P EST HI 40 MIN: CPT

## 2025-09-09 ENCOUNTER — APPOINTMENT (OUTPATIENT)
Dept: ULTRASOUND IMAGING | Facility: HOSPITAL | Age: 85
End: 2025-09-09
Payer: MEDICARE

## 2025-09-09 PROCEDURE — 76536 US EXAM OF HEAD AND NECK: CPT | Mod: 26
